# Patient Record
Sex: FEMALE | Race: WHITE | NOT HISPANIC OR LATINO | Employment: OTHER | URBAN - METROPOLITAN AREA
[De-identification: names, ages, dates, MRNs, and addresses within clinical notes are randomized per-mention and may not be internally consistent; named-entity substitution may affect disease eponyms.]

---

## 2020-09-23 ENCOUNTER — HOSPITAL ENCOUNTER (EMERGENCY)
Facility: HOSPITAL | Age: 75
Discharge: HOME | End: 2020-09-23
Attending: EMERGENCY MEDICINE
Payer: MEDICARE

## 2020-09-23 DIAGNOSIS — S09.90XA INJURY OF HEAD, INITIAL ENCOUNTER: ICD-10-CM

## 2020-09-23 DIAGNOSIS — S00.03XA HEMATOMA OF SCALP, INITIAL ENCOUNTER: ICD-10-CM

## 2020-09-23 DIAGNOSIS — R52 PAIN: ICD-10-CM

## 2020-09-23 DIAGNOSIS — R03.0 ELEVATED BLOOD PRESSURE READING: ICD-10-CM

## 2020-09-23 DIAGNOSIS — S52.571A OTHER CLOSED INTRA-ARTICULAR FRACTURE OF DISTAL END OF RIGHT RADIUS, INITIAL ENCOUNTER: Primary | ICD-10-CM
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
## 2020-09-23 PROCEDURE — 29125 APPL SHORT ARM SPLINT STATIC: CPT | Mod: RT

## 2020-09-23 PROCEDURE — 25000003 PHARM REV CODE 250: Performed by: NURSE PRACTITIONER

## 2020-09-23 PROCEDURE — 29105 APPLICATION LONG ARM SPLINT: CPT | Mod: RT

## 2020-09-23 PROCEDURE — 99284 EMERGENCY DEPT VISIT MOD MDM: CPT | Mod: 25

## 2020-09-23 RX ORDER — HYDROCODONE BITARTRATE AND ACETAMINOPHEN 5; 325 MG/1; MG/1
1 TABLET ORAL
Status: COMPLETED | OUTPATIENT
Start: 2020-09-23 | End: 2020-09-23

## 2020-09-23 RX ORDER — OXYCODONE AND ACETAMINOPHEN 5; 325 MG/1; MG/1
1 TABLET ORAL EVERY 8 HOURS PRN
Qty: 9 TABLET | Refills: 0 | Status: SHIPPED | OUTPATIENT
Start: 2020-09-23

## 2020-09-23 RX ADMIN — HYDROCODONE BITARTRATE AND ACETAMINOPHEN 1 TABLET: 5; 325 TABLET ORAL at 09:09

## 2020-09-24 ENCOUNTER — TELEPHONE (OUTPATIENT)
Dept: ORTHOPEDICS | Facility: CLINIC | Age: 75
End: 2020-09-24

## 2020-09-24 VITALS
DIASTOLIC BLOOD PRESSURE: 88 MMHG | WEIGHT: 165 LBS | HEART RATE: 83 BPM | SYSTOLIC BLOOD PRESSURE: 175 MMHG | OXYGEN SATURATION: 96 % | BODY MASS INDEX: 30.36 KG/M2 | HEIGHT: 62 IN | TEMPERATURE: 99 F | RESPIRATION RATE: 16 BRPM

## 2020-09-24 NOTE — ED NOTES
Pt. C/o rt. Wrist pain with limited rom since trip and fall earlier today, approx. 4hrs ago. The pt. Also has an egg sized hematoma to the rt. Side of her scalp. She denies loc, n/v or visual disturbances. There is minimal swelling to rt. Wrist but pt. Does have limited rom. 3+ radial pulse. Pt. Has normal color, temp., sensation to rt. Fingers.

## 2020-09-24 NOTE — ED PROVIDER NOTES
Encounter Date: 9/23/2020       History     Chief Complaint   Patient presents with    Wrist Pain     Pt presents with c/o right wrist pain after falling. Fell after running into piano. Hit head on piano. Denies loc and blood thinners     75-year-old female with past medical history of hypertension presents to the ED for evaluation after a fall.  The patient reports that she tripped over her piano, landing on outstretched right hand.  She reports hitting her head on a hard floor at the same time.  No loss of consciousness.  The patient denies headache, weakness, tingling, neck pain, visual disturbance, and anticoagulant use.  She does report right wrist pain.  She is right handed.  The patient was ambulatory into the ED. No other complaints at this time.    The history is provided by the patient.     Review of patient's allergies indicates:  No Known Allergies  No past medical history on file.  No past surgical history on file.  No family history on file.  Social History     Tobacco Use    Smoking status: Former Smoker     Start date: 11/25/1973    Smokeless tobacco: Never Used   Substance Use Topics    Alcohol use: Yes     Comment: socially    Drug use: No     Review of Systems   Constitutional: Negative for chills, fatigue and fever.   HENT: Negative for ear pain, rhinorrhea and sore throat.    Respiratory: Negative for shortness of breath.    Cardiovascular: Negative for chest pain.   Gastrointestinal: Negative for abdominal pain, diarrhea, nausea and vomiting.   Musculoskeletal: Positive for arthralgias.   Skin: Negative for rash.   Neurological: Negative for dizziness, syncope, weakness and headaches.   Hematological: Does not bruise/bleed easily.   Psychiatric/Behavioral: Negative for confusion.   All other systems reviewed and are negative.      Physical Exam     Initial Vitals [09/23/20 1939]   BP Pulse Resp Temp SpO2   (!) 213/102 92 18 98.1 °F (36.7 °C) 96 %      MAP       --         Physical  Exam    Nursing note and vitals reviewed.  Constitutional: She appears well-developed and well-nourished. She is active and cooperative. She is easily aroused.  Non-toxic appearance. She does not have a sickly appearance. She does not appear ill. No distress.   HENT:   Head: Normocephalic. Head is with contusion.       Right Ear: External ear normal. No drainage.   Left Ear: External ear normal. No drainage.   Nose: Nose normal.   Mouth/Throat: Uvula is midline and oropharynx is clear and moist.   Hematoma to right posterior scalp.  No crepitus, deformity, or step-off.  No open wounds.   Eyes: Conjunctivae, EOM and lids are normal. Pupils are equal, round, and reactive to light.   Neck: Normal range of motion, full passive range of motion without pain and phonation normal. Neck supple. No spinous process tenderness and no muscular tenderness present.   Cardiovascular: Normal rate, regular rhythm and normal heart sounds.   Pulses:       Radial pulses are 2+ on the right side and 2+ on the left side.   Pulmonary/Chest: Effort normal and breath sounds normal.   Abdominal: Soft. Normal appearance and bowel sounds are normal.   Musculoskeletal:      Right wrist: She exhibits decreased range of motion, tenderness, bony tenderness and swelling. She exhibits no effusion, no crepitus, no deformity and no laceration.      Right forearm: Normal.        Right hand: Normal.      Comments: Tenderness palpation of the right distal radius.  No scaphoid tenderness.   Neurological: She is alert, oriented to person, place, and time and easily aroused. She has normal strength. No cranial nerve deficit or sensory deficit. Coordination and gait normal. GCS eye subscore is 4. GCS verbal subscore is 5. GCS motor subscore is 6.   Skin: Skin is warm, dry and intact. Bruising (Right wrist and scalp) noted. No abrasion, no laceration and no rash noted. No erythema. No pallor.   Psychiatric: She has a normal mood and affect. Her speech is  normal and behavior is normal. Judgment and thought content normal. Cognition and memory are normal.         ED Course   Splint Application    Date/Time: 9/23/2020 10:55 PM  Performed by: ALYSON Cardenas  Authorized by: Kathrine Camejo MD   Consent Done: Yes  Consent: Verbal consent obtained. Written consent not obtained.  Risks and benefits: risks, benefits and alternatives were discussed  Consent given by: patient  Patient understanding: patient states understanding of the procedure being performed  Patient consent: the patient's understanding of the procedure matches consent given  Procedure consent: procedure consent matches procedure scheduled  Relevant documents: relevant documents present and verified  Imaging studies: imaging studies available  Patient identity confirmed: verbally with patient  Location details: right wrist  Splint type: sugar tong  Supplies used: cotton padding,  elastic bandage and Ortho-Glass  Post-procedure: The splinted body part was neurovascularly unchanged following the procedure.  Patient tolerance: Patient tolerated the procedure well with no immediate complications  Comments: Splint by VIRGINIA Hadley        Labs Reviewed - No data to display       Imaging Results          CT Cervical Spine Without Contrast (In process)  Result time 09/23/20 23:04:33               CT Head Without Contrast (Final result)  Result time 09/23/20 23:01:55    Final result by Lyle Tyson MD (09/23/20 23:01:55)                 Impression:      No acute abnormality.      Electronically signed by: Lyle Tyson  Date:    09/23/2020  Time:    23:01             Narrative:    EXAMINATION:  CT HEAD WITHOUT CONTRAST    CLINICAL HISTORY:  Head trauma, minor (Age => 66y);    TECHNIQUE:  Low dose axial CT images obtained throughout the head without intravenous contrast. Sagittal and coronal reconstructions were performed.    COMPARISON:  None.    FINDINGS:  Intracranial compartment:    Ventricles  and sulci are normal in size for age without evidence of hydrocephalus. No extra-axial blood or fluid collections.    The brain parenchyma appears normal. No parenchymal mass, hemorrhage, edema or major vascular distribution infarct.    Skull/extracranial contents (limited evaluation): No fracture. Mastoid air cells and paranasal sinuses are essentially clear.                               X-Ray Wrist Complete Right (Final result)  Result time 09/23/20 21:38:39    Final result by Gera Lam MD (09/23/20 21:38:39)                 Impression:      Acute mildly comminuted mildly displaced intra-articular fracture of the distal right radius.      Electronically signed by: Gera Lam MD  Date:    09/23/2020  Time:    21:38             Narrative:    EXAMINATION:  XR WRIST COMPLETE 3 VIEWS RIGHT    CLINICAL HISTORY:  Pain, unspecified    TECHNIQUE:  PA, lateral, and oblique views of the right wrist were performed.    COMPARISON:  None    FINDINGS:  There is diffuse osteopenia.  There is a mildly comminuted mildly displaced intra-articular fracture of the distal radius.  There is mild depression of the radial lunate articular surface.  There are scattered degenerative changes of the carpal and carpometacarpal articulations.  There is diffuse soft tissue edema about the right wrist.                                 Medical Decision Making:   Differential Diagnosis:   Fracture, sprain, strain, dislocation, ICH  Clinical Tests:   Radiological Study: Ordered and Reviewed  ED Management:  X-ray right wrist, CT head and C-spine, p.o. [place]    The patient's right wrist x-ray shows an intra-articular mildly comminuted mildly displaced fracture of the right distal radius.  Neurovascularly intact distal to injury.  She has no signs of compartment syndrome.  No open wounds.  After discussion with Orthopedics, the patient was placed in a sugar-tong splint and sling.  Pain was well-controlled with [place].     CT head and  C-spine negative for acute changes.  Imaging was obtained due to distracting injury.    An Epic message was sent to  as requested.      The patient's blood pressure was noted to be elevated while in the ED today.  The patient has no associated signs or symptoms of hypertension.  Patient's blood pressure is likely elevated due to situation.  Advised blood pressure recheck by PCP within 1 week.    Pt will f/u with ortho tomorrow.  Amb referral was placed.      Reviewed splint care and return precautions.  Also reviewed narcotic prescription precautions and need for OTC stool softener to prevent constipation.  Pt and  verbalized understanding, compliance, and agreement with the treatment plan.      Other:   I have discussed this case with another health care provider.       <> Summary of the Discussion: Discussed with , ortho.   advised placing the patient in a splint and she will f/u in office tomorrow.      Reviewed with  who agrees with ED course and disposition.                              Clinical Impression:       ICD-10-CM ICD-9-CM   1. Other closed intra-articular fracture of distal end of right radius, initial encounter  S52.571A 813.42   2. Pain  R52 780.96   3. Elevated blood pressure reading  R03.0 796.2   4. Hematoma of scalp, initial encounter  S00.03XA 920   5. Fall, initial encounter  W19.XXXA E888.9   6. Injury of head, initial encounter  S09.90XA 959.01                                               Ayanna Verdin, [facility]  09/23/20 4716

## 2020-09-24 NOTE — ED NOTES
Sugar tong splint applied to right forearm and wrapped with ace wraps. Sling applied for comfort. Pt. Verbalized understanding of cast care and checking circulation. Instructed to return to the ER for increased pain or swelling while taking prescription medication for pain. Pt. Tolerated application of splint well. ALYSON Urena at the bedside checking cast.

## 2020-09-24 NOTE — TELEPHONE ENCOUNTER
Attempted to contact pt. Left voicemail. Asked pt to return call to clinic at [phone] to schedule ED f/u appt c Dr. Emerson for wrist fx.     ==  Attempted to contact pt's contact Keshawn. Left voicemail. Asked pt to return call to clinic at [phone] to schedule ED f/u appt c Dr. Emerson for wrist fx.

## 2020-09-24 NOTE — FIRST PROVIDER EVALUATION
Emergency Department TeleTriage Encounter Note      CHIEF COMPLAINT    Chief Complaint   Patient presents with    Wrist Pain     Pt presents with c/o right wrist pain after falling. Fell after running into piano. Hit head on piano. Denies loc and blood thinners       VITAL SIGNS   Initial Vitals [09/23/20 1939]   BP Pulse Resp Temp SpO2   (!) 213/102 92 18 98.1 °F (36.7 °C) 96 %      MAP       --            ALLERGIES    Review of patient's allergies indicates:  No Known Allergies    PROVIDER TRIAGE NOTE  This is a teletriage evaluation of a 75 y.o. female presenting to the ED with c/o right wrist pain after mechanical fall.  Pain with range of motion.  Also hit head, but denies significant pain or loss of consciousness. Initial orders will be placed and care will be transferred to an alternate provider when patient is roomed for a full evaluation. Any additional orders and the final disposition will be determined by that provider.         ORDERS  Labs Reviewed - No data to display    ED Orders (720h ago, onward)    Start Ordered     Status Ordering Provider    09/1946 09/1946  X-Ray Wrist Complete Right  1 time imaging      Ordered KELLIE BALTAZAR            Virtual Visit Note: The provider triage portion of this emergency department evaluation and documentation was performed via [employer], a HIPAA-compliant telemedicine application, in concert with a tele-presenter in the room. A face to face patient evaluation with one of my colleagues will occur once the patient is placed in an emergency department room.      DISCLAIMER: This note was prepared with [employer] voice recognition transcription software. Garbled syntax, mangled pronouns, and other bizarre constructions may be attributed to that software system.

## 2020-09-24 NOTE — ED NOTES
Pt. Care assumed, pt. And spouse Updated on the plan of care. Pt. Rates pain a 0 at this time. Bed in the low position, side rails elevated x 2 and call light at the bedside.

## 2020-09-24 NOTE — TELEPHONE ENCOUNTER
----- Message from Ilana iVllareal MD sent at 9/24/2020  8:09 AM CDT -----  Thanks! Got it!  LS  ----- Message -----  From: ALYSON Cardenas  Sent: 9/23/2020   9:58 PM CDT  To: Ilana Villareal MD    Hi ,   This is the patient I discussed with you on 9/23.  She has an intraarticular right wrist fracture with mild displacement.    Thank you,  Ayanna Verdin

## 2020-09-24 NOTE — ED NOTES
ALYSON Urena at the bedside reviewing results and plan of care with pt. And spouse. Pt. Updated that she will have a CT scan of her head next.

## 2020-09-24 NOTE — DISCHARGE INSTRUCTIONS
You have been prescribed Percocet (Oxycodone) for pain. Please do not take this medication while working, drinking alcohol, swimming, or while driving/operating heavy machinery. This medication may cause drowsiness, dizziness, impair judgment, and reduce physical capabilities.You should not drive, operate heavy machinery, or make life changing decisions while taking this medication.      This medication contains Tylenol. Please do not take any additional Tylenol while you are taking this medication.     While in the Emergency Department you received a medication for for your symptoms (Norco (Hydrocodone)). This medication may cause drowsiness, dizziness, impaired judgment, and reduced physical capabilities. You should not drive, operate heavy machinery, or make life  changing decisions within 24 hours of receiving this medication.        Your blood pressure was a little high today, likely because of your situation.  You should have your blood pressure rechecked by your doctor within one week.     Return to the ED if your condition changes, progresses, or if you have any concerns.      Follow up with Orthopedics tomorrow. Do not remove your splint.

## 2020-09-24 NOTE — ED NOTES
DILMA De Dios at bedside for exam. Ice pack applied to rt. Wrist and elevated on pillow.  X-ray called for pt. Ready for x-ray.

## 2020-09-29 NOTE — TELEPHONE ENCOUNTER
----- Message from Leo Hernandez sent at 9/24/2020 11:23 AM CDT -----  Regarding: Pt Advice  Contact: JOE FELIX [[id number]]  Type:  Patient Returning Call    Who Called: JOE FELIX [[id number]]    Who Left Message for Patient: Evelyn    Does the patient know what this is regarding?: yes    Would the patient rather a call back or a response via [facility]? no    Best Call Back Number:    Additional Information: thank you for the follow up surgery has been scheduled with orthopedic doctor       
Attempted to contact pt. Left voicemail. Asked pt to return call to clinic at [phone] to confirm that she is seeking outside treatment for distal radius fx.     
Spoke c pt's . He stated that pt will be seen by an outside MD at  who they have a previous relationship with.   
Detail Level: Zone
Continue Regimen: Keralyt 5% cream daily

## 2021-01-10 ENCOUNTER — IMMUNIZATION (OUTPATIENT)
Dept: OBSTETRICS AND GYNECOLOGY | Facility: CLINIC | Age: 76
End: 2021-01-10
Payer: MEDICARE

## 2021-01-10 DIAGNOSIS — Z23 NEED FOR VACCINATION: ICD-10-CM

## 2021-01-10 PROCEDURE — 91300 COVID-19, MRNA, LNP-S, PF, 30 MCG/0.3 ML DOSE VACCINE: CPT | Mod: PBBFAC | Performed by: FAMILY MEDICINE

## 2021-01-31 ENCOUNTER — IMMUNIZATION (OUTPATIENT)
Dept: OBSTETRICS AND GYNECOLOGY | Facility: CLINIC | Age: 76
End: 2021-01-31
Payer: MEDICARE

## 2021-01-31 DIAGNOSIS — Z23 NEED FOR VACCINATION: Primary | ICD-10-CM

## 2021-01-31 PROCEDURE — 91300 PR SARS-COV- 2 COVID-19 VACCINE, NO PRSV, 30MCG/0.3ML, IM: CPT | Mod: PBBFAC | Performed by: FAMILY MEDICINE

## 2021-01-31 PROCEDURE — 0002A PR IMMUNIZ ADMIN, SARS-COV-2 COVID-19 VACC, 30MCG/0.3ML, 2ND DOSE: CPT | Mod: PBBFAC | Performed by: FAMILY MEDICINE

## 2021-01-31 RX ADMIN — RNA INGREDIENT BNT-162B2 0.3 ML: 0.23 INJECTION, SUSPENSION INTRAMUSCULAR at 11:01

## 2021-12-01 ENCOUNTER — IMMUNIZATION (OUTPATIENT)
Dept: PHARMACY | Facility: CLINIC | Age: 76
End: 2021-12-01

## 2021-12-01 DIAGNOSIS — Z23 NEED FOR VACCINATION: Primary | ICD-10-CM

## 2024-03-28 ENCOUNTER — HOSPITAL ENCOUNTER (EMERGENCY)
Facility: HOSPITAL | Age: 79
Discharge: HOME | End: 2024-03-28
Attending: EMERGENCY MEDICINE
Payer: MEDICARE

## 2024-03-28 VITALS
WEIGHT: 160 LBS | RESPIRATION RATE: 18 BRPM | DIASTOLIC BLOOD PRESSURE: 86 MMHG | HEART RATE: 90 BPM | HEIGHT: 62 IN | TEMPERATURE: 98 F | OXYGEN SATURATION: 98 % | SYSTOLIC BLOOD PRESSURE: 189 MMHG | BODY MASS INDEX: 29.44 KG/M2
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
## 2024-03-28 DIAGNOSIS — Z51.89 VISIT FOR WOUND CHECK: ICD-10-CM

## 2024-03-28 PROCEDURE — 90715 TDAP VACCINE 7 YRS/> IM: CPT | Performed by: PHYSICIAN ASSISTANT

## 2024-03-28 PROCEDURE — 63600175 PHARM REV CODE 636 W HCPCS: Performed by: PHYSICIAN ASSISTANT

## 2024-03-28 PROCEDURE — 99284 EMERGENCY DEPT VISIT MOD MDM: CPT | Mod: 25

## 2024-03-28 PROCEDURE — 90471 IMMUNIZATION ADMIN: CPT | Performed by: PHYSICIAN ASSISTANT

## 2024-03-28 RX ORDER — BACITRACIN ZINC 500 UNIT/G
OINTMENT (GRAM) TOPICAL 2 TIMES DAILY
Qty: 30 G | Refills: 0 | Status: SHIPPED | OUTPATIENT
Start: 2024-03-28

## 2024-03-28 RX ORDER — AMOXICILLIN AND CLAVULANATE POTASSIUM 875; 125 MG/1; MG/1
1 TABLET, FILM COATED ORAL 2 TIMES DAILY
Qty: 14 TABLET | Refills: 0 | Status: SHIPPED | OUTPATIENT
Start: 2024-03-28

## 2024-03-28 RX ADMIN — TETANUS TOXOID, REDUCED DIPHTHERIA TOXOID AND ACELLULAR PERTUSSIS VACCINE, ADSORBED 0.5 ML: 5; 2.5; 8; 8; 2.5 SUSPENSION INTRAMUSCULAR at 08:03

## 2024-03-28 NOTE — DISCHARGE INSTRUCTIONS

## 2024-03-28 NOTE — ED PROVIDER NOTES
Encounter Date: 3/28/2024       History     Chief Complaint   Patient presents with    Animal Bite     Noted to right hand, 1 small puncture wound noted      78-year-old female presents to ED with concern of puncture wound to right hand after she was accidentally bit by her dog this morning.  Dog is up-to-date on vaccines.  Her tetanus is not up-to-date.  She reports after bite occurred she immediately applied peroxide but noticed gradual swelling.  She does state swelling has stopped.  No numbness or focal weakness.  No other reported injuries.  She has no concern for rabies.  No other acute complaints at this time.    The history is provided by the patient.     Review of patient's allergies indicates:  No Known Allergies  No past medical history on file.  No past surgical history on file.  No family history on file.  Social History     Tobacco Use    Smoking status: Former     Types: Cigarettes     Start date: 11/25/1973    Smokeless tobacco: Never   Substance Use Topics    Alcohol use: Yes     Comment: socially    Drug use: No     Review of Systems   Skin:  Positive for wound.   Neurological:  Negative for weakness and numbness.       Physical Exam     Initial Vitals [03/28/24 0820]   BP Pulse Resp Temp SpO2   (!) 189/86 90 18 97.8 °F (36.6 °C) 98 %      MAP       --         Physical Exam    Vitals reviewed.  Constitutional: She appears well-developed and well-nourished. She is active. She does not have a sickly appearance. She does not appear ill. No distress.   HENT:   Head: Normocephalic and atraumatic.   Neck:   Normal range of motion.  Musculoskeletal:        Hands:       Cervical back: Normal range of motion.      Comments: Puncture wound to right hand near 1st and 2nd digit webspace.  Localized swelling, suspected to to underlying hematoma.  No other palpable defects.  Full range motion and sensation throughout all fingers.     Neurological: She is alert. GCS eye subscore is 4. GCS verbal subscore is 5.  GCS motor subscore is 6.   Skin: Skin is warm and dry.   Psychiatric: She has a normal mood and affect. Her speech is normal and behavior is normal.         ED Course   Procedures  Labs Reviewed - No data to display       Imaging Results              X-Ray Hand 3 view Right (Final result)  Result time 03/28/24 09:17:16      Final result by Jese Kelly DO (03/28/24 09:17:16)                   Impression:      No acute osseous abnormality.    Degenerative joint space narrowing as above.      Electronically signed by: Jese Kelly  Date:    03/28/2024  Time:    09:17               Narrative:    EXAMINATION:  XR HAND COMPLETE 3 VIEW RIGHT    CLINICAL HISTORY:  dog bite;    TECHNIQUE:  PA, lateral, and oblique views of the right hand were performed.    COMPARISON:  None    FINDINGS:  There is osteopenia.  There is no acute fracture or dislocation.  Alignment is normal.  There is partially imaged hardware overlying the distal radius.  There is severe joint space narrowing of the distal interphalangeal joints of the 2nd through 5th digits with bulky osteophytes.  There is moderate joint space narrowing the base of the thumb.  Soft tissues are unremarkable without soft tissue edema, soft tissue gas, or a radiopaque foreign body.                                       Medications   Tdap (BOOSTRIX) vaccine injection 0.5 mL (0.5 mLs Intramuscular Given 3/28/24 0857)     Medical Decision Making  Patient presents with concern of dog bite to right hand that occurred just prior to arrival.  Afebrile.  Small puncture wound noted to dorsal surface of right hand.  Bleeding appearing controlled.    DDx:  Including but not limited to dog bite, puncture wound, foreign body    Amount and/or Complexity of Data Reviewed  Radiology: ordered. Decision-making details documented in ED Course.    Risk  OTC drugs.  Prescription drug management.               ED Course as of 03/28/24 0941   u Mar 28, 2024   0909 X-Ray Hand 3 view  Right  X-ray right hand per my interpretation noting arthritic changes throughout hand and postoperative plate to distal radius.  No acute fractures or radiopaque foreign bodies visualized.  Images reviewed by Radiology. [KS]   0914 Wound site was thoroughly cleaned in ED and sterile dressings were applied.  Tetanus updated.  Prescription written for bacitracin and Augmentin.  Encouraged to continue monitoring wound healing very closely with PCP follow-up.  ED return precautions were discussed at length with both patient and spouse.  They state their understanding and agree with plan. [KS]      ED Course User Index  [KS] José Miguel Whiting PA-C                           Clinical Impression:  Final diagnoses:  [W54.0XXA] Dog bite, initial encounter (Primary)  [Z51.89] Visit for wound check          ED Disposition Condition    Discharge Stable          ED Prescriptions       Medication Sig Dispense Start Date End Date Auth. Provider    amoxicillin-clavulanate 875-125mg (AUGMENTIN) 875-125 mg per tablet Take 1 tablet by mouth 2 (two) times daily. 14 tablet 3/28/2024 -- José Miguel Whiting PA-C    bacitracin 500 unit/gram Oint Apply topically 2 (two) times daily. 30 g 3/28/2024 -- José Miguel Whiting PA-C          Follow-up Information       Follow up With Specialties Details Why Contact Info    Your Doctor                 José Miguel Whiting PA-C  03/28/24 2951